# Patient Record
Sex: MALE | Race: BLACK OR AFRICAN AMERICAN | NOT HISPANIC OR LATINO | Employment: PART TIME | URBAN - METROPOLITAN AREA
[De-identification: names, ages, dates, MRNs, and addresses within clinical notes are randomized per-mention and may not be internally consistent; named-entity substitution may affect disease eponyms.]

---

## 2024-08-26 ENCOUNTER — HOSPITAL ENCOUNTER (EMERGENCY)
Facility: CLINIC | Age: 24
Discharge: HOME | End: 2024-08-26
Attending: EMERGENCY MEDICINE | Admitting: EMERGENCY MEDICINE
Payer: COMMERCIAL

## 2024-08-26 VITALS
OXYGEN SATURATION: 98 % | TEMPERATURE: 98.2 F | HEART RATE: 83 BPM | SYSTOLIC BLOOD PRESSURE: 146 MMHG | WEIGHT: 171 LBS | BODY MASS INDEX: 21.94 KG/M2 | DIASTOLIC BLOOD PRESSURE: 85 MMHG | RESPIRATION RATE: 18 BRPM | HEIGHT: 74 IN

## 2024-08-26 DIAGNOSIS — F07.81 POST CONCUSSIVE SYNDROME: ICD-10-CM

## 2024-08-26 DIAGNOSIS — S40.022A ARM CONTUSION, LEFT, INITIAL ENCOUNTER: ICD-10-CM

## 2024-08-26 PROCEDURE — 99283 EMERGENCY DEPT VISIT LOW MDM: CPT

## 2024-08-26 ASSESSMENT — COLUMBIA-SUICIDE SEVERITY RATING SCALE - C-SSRS
1. IN THE PAST MONTH, HAVE YOU WISHED YOU WERE DEAD OR WISHED YOU COULD GO TO SLEEP AND NOT WAKE UP?: NO
6. HAVE YOU EVER DONE ANYTHING, STARTED TO DO ANYTHING, OR PREPARED TO DO ANYTHING TO END YOUR LIFE?: NO
2. HAVE YOU ACTUALLY HAD ANY THOUGHTS OF KILLING YOURSELF IN THE PAST MONTH?: NO

## 2024-08-26 NOTE — ED NOTES
See provider's notes for full assessment and evaluation. Education provided to pt on worsening symptoms to watch out for and follow-up with PCP.  AVS thoroughly reviewed with pt. All questions were answered.

## 2024-08-26 NOTE — ED PROVIDER NOTES
EMERGENCY DEPARTMENT ENCOUNTER      NAME: Honorio Hidalgo  AGE: 23 year old male  YOB: 2000  MRN: [id number]  EVALUATION DATE & TIME: No admission date for patient encounter.    PCP: No primary care provider on file.    ED PROVIDER: Álvaro Wolfe M.D.      Chief Complaint   Patient presents with    Arm Pain         FINAL IMPRESSION:  1. Arm contusion, left, initial encounter    2. Post concussive syndrome          ED COURSE & MEDICAL DECISION MAKING:    Pertinent Labs & Imaging studies reviewed. (See chart for details)  23 year old male presents to the Emergency Department for evaluation of left arm contusion.  Patient had a lightheaded episode while he was in the shower.  Did strike his arm against his shower.  Does have a large contusion there.  It is soft.  No signs of compartment syndrome.  Discussed supportive care.  Discussed reasons to return.  Has been having intermittent lightheaded episodes.  This seems more like vertigo.  It started did after his concussion.  Seems likely postconcussive syndrome.  Had gone to PT but is no longer going.  Normal neurologic exam.  No signs of head injury.  No indication for head CT.  Patient feeling better here.  Will discharge home.  Discussed abortive care    2:21 AM I met with the patient to gather history and to perform my initial exam. I discussed the plan for care while in the Emergency Department.   2:44 AM Patient to be discharged.     At the conclusion of the encounter I discussed the results of all of the tests and the disposition. The questions were answered. The patient or family acknowledged understanding and was agreeable with the care plan.     Medical Decision Making  Obtained supplemental history:Supplemental history obtained?: Documented in chart  Reviewed external records: External records reviewed?: Documented in chart  Care impacted by chronic illness:N/A  Care significantly affected by social determinants of health:Access to Medical  Care  Did you consider but not order tests?: Work up considered but not performed and documented in chart, if applicable  Did you interpret images independently?: Independent interpretation of ECG and images noted in documentation, when applicable.  Consultation discussion with other provider:Did you involve another provider (consultant, , pharmacy, etc.)?: No  Discharge. No recommendations on prescription strength medication(s). See documentation for any additional details.  No MIPS measures identified.      MEDICATIONS GIVEN IN THE EMERGENCY:  Medications - No data to display    NEW PRESCRIPTIONS STARTED AT TODAY'S ER VISIT  There are no discharge medications for this patient.         =================================================================    HPI    Patient information was obtained from: Patient and sister    Use of : N/A      Honorio Hidalgo is a 23 year old male with no pertinent medical history who presents to this ED for evaluation of arm pain.    The patient reports here with left forearm pain that started after he fell on his left arm in the shower about two to three hours ago.  He reports having a migraine all day and lost his balance in the shower.  He denies hitting his head. He notes bruising and pain to the left forearm as well as tingling to the left hand.  He has had migraines on and off since a concussion in February.  He has followed with Neurology. He did take Ibuprofen for his headache, which does help.  He denies any extremity weakness or numbness.  He denies any chronic medication use.  He denies using any alcohol or illicit drugs tonight.  He denies any other complaints at this time.    PAST MEDICAL HISTORY:  No past medical history on file.    PAST SURGICAL HISTORY:  No past surgical history on file.        CURRENT MEDICATIONS:    No current facility-administered medications for this encounter.     No current outpatient medications on file.         ALLERGIES:  No Known  "Allergies    FAMILY HISTORY:  No family history on file.    SOCIAL HISTORY:        VITALS:  BP (!) 146/85   Pulse 83   Temp 98.2  F (36.8  C) (Oral)   Resp 18   Ht 1.88 m (6' 2\")   Wt 77.6 kg (171 lb)   SpO2 98%   BMI 21.96 kg/m      PHYSICAL EXAM    Physical Exam  Vitals and nursing note reviewed.   Constitutional:       General: He is not in acute distress.     Appearance: He is not diaphoretic.   HENT:      Head: Atraumatic.   Eyes:      General: No scleral icterus.     Pupils: Pupils are equal, round, and reactive to light.   Cardiovascular:      Rate and Rhythm: Normal rate and regular rhythm.      Heart sounds: Normal heart sounds.   Pulmonary:      Effort: No respiratory distress.      Breath sounds: Normal breath sounds.   Abdominal:      Palpations: Abdomen is soft.      Tenderness: There is no abdominal tenderness.   Musculoskeletal:         General: No tenderness.      Comments: Large contusion over left forearm.  Compartments are soft.  Pulse intact.  Range of motion intact.  Hand is not cold.  Sensation intact.  Strength intact   Lymphadenopathy:      Cervical: No cervical adenopathy.   Skin:     General: Skin is warm.      Findings: Bruising and erythema present. No rash.   Neurological:      Comments: 5 out of 5 strength in bilateral upper and lower extremities.  Sensation intact in all 4 extremes.  Cranial nerves intact.  No pronator drift               LAB:  All pertinent labs reviewed and interpreted.  Labs Ordered and Resulted from Time of ED Arrival to Time of ED Departure - No data to display    RADIOLOGY:  Reviewed all pertinent imaging. Please see official radiology report.  No orders to display       I, Baldomero Lange, am serving as a scribe to document services personally performed by Dr. Álvaro Wolfe, based on my observation and the provider's statements to me. I, Álvaro Wolfe MD attest that Baldomero Lange is acting in a scribe capacity, has observed my performance of the " services and has documented them in accordance with my direction.    Álvaro Wolfe M.D.  Emergency Medicine  [facility] EMERGENCY ROOM  [address] [id number] [phone]  Dept: [phone]      Álvaro Wolfe MD  08/26/24 7523

## 2024-08-26 NOTE — ED TRIAGE NOTES
Midnight tonight pt was getting out of the shower, he had a strong migraine and felt dizzy loss his balance and hit his left forearm on something metal.  Pt has pain with movement of his fingers and wrist.  Pts CMS intact.       Triage Assessment (Adult)       Row Name 08/26/24 0224          Triage Assessment    Airway WDL WDL        Respiratory WDL    Respiratory WDL WDL        Skin Circulation/Temperature WDL    Skin Circulation/Temperature WDL WDL        Cardiac WDL    Cardiac WDL WDL        Peripheral/Neurovascular WDL    Peripheral Neurovascular WDL WDL

## 2024-08-28 ENCOUNTER — NURSE TRIAGE (OUTPATIENT)
Dept: NURSING | Facility: CLINIC | Age: 24
End: 2024-08-28
Payer: COMMERCIAL

## 2024-08-29 NOTE — TELEPHONE ENCOUNTER
"Permission given to speak to sister by patient.   [facility]  Seen in ER 2 nights ago after a really hard blow to his left forearm: swollen and bruised hematoma. The ER Dr told them that if it hardened it would be a concern. The swelling has gone down, but it is still red and now is really hard. He no longer has tingling in that hand. His forearm is sore, but he is able to use that arm. He used ice the first night, now using warm compresses, compression and rest. Pain =\"6-7\" currently: \"getting better\". The size of the bruise=1\" in diameter: this is where it feels hard. It also feels hard by the left wrist. No fever noted.  Caller's question is relating to the hardness.     Recommended either return to the ER, or contact oncall provider for his clinic and discuss question with a provider. Caller intends to call clinic in am, she does not want to call oncall provider tonight, and he does not want to return to the ER tonight.    Lisbet Morley RN Triage Nurse Advisor 9:25 PM 8/28/2024   Reason for Disposition   Minor injury or bruising from direct blow    Additional Information   Negative: Serious injury with multiple fractures (broken bones)   Negative: [1] Major bleeding (e.g., actively dripping or spurting) AND [2] can't be stopped   Negative: Sounds like a life-threatening emergency to the triager   Negative: Wound looks infected   Negative: Arm pain from overuse (e.g., sports, lifting, physical work)   Negative: Arm pain not from an injury   Negative: Shoulder injury is main concern   Negative: Elbow injury is main concern   Negative: Wrist or hand injury is main concern   Negative: Finger injury is main concern   Negative: Bullet wound, stabbed by knife, or other serious penetrating wound   Negative: Looks like a broken bone (crooked or deformed)   Negative: Looks like a dislocated joint   Negative: Can't move injured arm at all   Negative: [1] Bleeding AND [2] won't stop after 10 minutes of direct " pressure (using correct technique)   Negative: Skin is split open or gaping (or length > 1/2 inch or 12 mm)   Negative: [1] Dirt in the wound AND [2] not removed with 15 minutes of scrubbing   Negative: Sounds like a serious injury to the triager   Negative: [1] SEVERE pain AND [2] not improved 2 hours after pain medicine/ice packs   Negative: [1] Large swelling or bruise around joint (wrist, elbow, shoulder) AND [2] can't move injured arm normally (bend or straighten completely)   Negative: [1] After day 2 AND [2] pain at site of injury becomes worse AND [3] unexplained fever occurs   Negative: Suspicious history for the injury   Negative: Can't move injured arm normally (bend or straighten completely)   Negative: Large swelling or bruise (> 2 inches or 5 cm)   Negative: [1] High-risk adult (e.g., age > 60 years, osteoporosis, chronic steroid use) AND [2] MILD to MODERATE pain   Negative: [1] No prior tetanus shots (or is not fully vaccinated) AND [2] any wound (e.g., cut or scrape)   Negative: [1] HIV positive or severe immunodeficiency (severely weak immune system) AND [2] DIRTY cut or scrape   Negative: [1] Last tetanus shot > 5 years ago AND [2] DIRTY cut or scrape   Negative: [1] Last tetanus shot > 10 years ago AND [2] CLEAN cut or scrape (e.g., object AND skin were clean)   Negative: Biceps muscle tear suspected (new bulge in upper arm area of biceps muscle, felt a pop)   Negative: [1] After 3 days AND [2] pain not improving   Negative: [1] After 2 weeks AND [2] still painful    Protocols used: Arm Injury-A-